# Patient Record
Sex: MALE | Race: WHITE | NOT HISPANIC OR LATINO | URBAN - METROPOLITAN AREA
[De-identification: names, ages, dates, MRNs, and addresses within clinical notes are randomized per-mention and may not be internally consistent; named-entity substitution may affect disease eponyms.]

---

## 2019-10-22 ENCOUNTER — OFFICE VISIT (OUTPATIENT)
Dept: URGENT CARE | Facility: CLINIC | Age: 39
End: 2019-10-22
Payer: COMMERCIAL

## 2019-10-22 VITALS
OXYGEN SATURATION: 98 % | HEIGHT: 70 IN | RESPIRATION RATE: 16 BRPM | TEMPERATURE: 100.8 F | DIASTOLIC BLOOD PRESSURE: 82 MMHG | SYSTOLIC BLOOD PRESSURE: 136 MMHG | HEART RATE: 95 BPM | BODY MASS INDEX: 30.52 KG/M2 | WEIGHT: 213.2 LBS

## 2019-10-22 DIAGNOSIS — J02.9 SORE THROAT: Primary | ICD-10-CM

## 2019-10-22 LAB — S PYO AG THROAT QL: NEGATIVE

## 2019-10-22 PROCEDURE — 87880 STREP A ASSAY W/OPTIC: CPT | Performed by: FAMILY MEDICINE

## 2019-10-22 PROCEDURE — 87070 CULTURE OTHR SPECIMN AEROBIC: CPT | Performed by: FAMILY MEDICINE

## 2019-10-22 PROCEDURE — 99213 OFFICE O/P EST LOW 20 MIN: CPT | Performed by: FAMILY MEDICINE

## 2019-10-22 RX ORDER — DIPHENOXYLATE HYDROCHLORIDE AND ATROPINE SULFATE 2.5; .025 MG/1; MG/1
1 TABLET ORAL DAILY
COMMUNITY

## 2019-10-22 RX ORDER — AMOXICILLIN 500 MG/1
500 CAPSULE ORAL EVERY 8 HOURS SCHEDULED
Qty: 21 CAPSULE | Refills: 0 | Status: SHIPPED | OUTPATIENT
Start: 2019-10-22 | End: 2019-10-29

## 2019-10-22 NOTE — LETTER
October 22, 2019     Patient: Elesa Homans   YOB: 1980   Date of Visit: 10/22/2019       To Whom it May Concern:    Elesa Homans was seen in my clinic on 10/22/2019  He is excused from work 10/21-23/2019 due to illness  If you have any questions or concerns, please don't hesitate to call           Sincerely,          RAFAEL Andrade        CC: No Recipients

## 2019-10-22 NOTE — PROGRESS NOTES
NAME: Soniya Lang is a 44 y o  male  : 1980    MRN: [id number]      Assessment and Plan   Sore throat [J02 9]  1  Sore throat  POCT rapid strepA    amoxicillin (AMOXIL) 500 mg capsule    Throat culture           Patient Instructions   Patient Instructions   F/u as needed  Rapid strep neg   But fever and exudate  Begin amox  Send for cx    Proceed to ER if symptoms worsen  Chief Complaint     Chief Complaint   Patient presents with    Sore Throat     since saturday, c/o fevers         History of Present Illness   Here c/o sore throat  Fever  Mild cough  3 days  Diarrhea  Nausea  Taking ibuprofen, nyquil and dayquil      Review of Systems   Review of Systems   Constitutional: Positive for fever  Negative for fatigue  HENT: Positive for sore throat  Negative for ear pain and trouble swallowing  Eyes: Negative for visual disturbance  Respiratory: Positive for cough  Negative for chest tightness and shortness of breath  Cardiovascular: Negative for chest pain  Gastrointestinal: Positive for diarrhea and nausea  Negative for abdominal pain and vomiting  Genitourinary: Negative for difficulty urinating and dysuria  Skin: Negative for rash and wound  Neurological: Positive for headaches  Negative for weakness  Psychiatric/Behavioral: Negative for behavioral problems and confusion  Current Medications       Current Outpatient Medications:     multivitamin (THERAGRAN) TABS, Take 1 tablet by mouth daily, Disp: , Rfl:     amoxicillin (AMOXIL) 500 mg capsule, Take 1 capsule (500 mg total) by mouth every 8 (eight) hours for 7 days, Disp: 21 capsule, Rfl: 0    Current Allergies     Allergies as of 10/22/2019    (No Known Allergies)              Past Medical History:   Diagnosis Date    Migraines        History reviewed  No pertinent surgical history  History reviewed  No pertinent family history  Medications have been verified      The following portions of the patient's history were reviewed and updated as appropriate: allergies, current medications, past family history, past medical history, past social history, past surgical history and problem list     Objective   /82   Pulse 95   Temp (!) 100 8 °F (38 2 °C) (Tympanic)   Resp 16   Ht 5' 10" (1 778 m)   Wt 96 7 kg (213 lb 3 2 oz)   SpO2 98%   BMI 30 59 kg/m²      Physical Exam     Physical Exam   Constitutional: He is oriented to person, place, and time  He appears well-developed and well-nourished  HENT:   Head: Normocephalic  Mouth/Throat: Oropharyngeal exudate present  Eyes: EOM are normal    Neck: Normal range of motion  Cardiovascular: Normal rate, regular rhythm and normal heart sounds  Exam reveals no gallop and no friction rub  No murmur heard  Pulmonary/Chest: Effort normal and breath sounds normal  No respiratory distress  He has no wheezes  He has no rales  Abdominal: Soft  Bowel sounds are normal  He exhibits no distension  There is no tenderness  There is no rebound and no guarding  Musculoskeletal: Normal range of motion  Lymphadenopathy:     He has cervical adenopathy  Neurological: He is oriented to person, place, and time  Skin: Skin is warm and dry  No rash noted  Psychiatric: He has a normal mood and affect  Thought content normal    Nursing note and vitals reviewed

## 2019-10-24 ENCOUNTER — TELEPHONE (OUTPATIENT)
Dept: URGENT CARE | Facility: CLINIC | Age: 39
End: 2019-10-24

## 2019-10-24 LAB — BACTERIA THROAT CULT: NORMAL
